# Patient Record
Sex: FEMALE | Race: WHITE | Employment: FULL TIME | URBAN - METROPOLITAN AREA
[De-identification: names, ages, dates, MRNs, and addresses within clinical notes are randomized per-mention and may not be internally consistent; named-entity substitution may affect disease eponyms.]

---

## 2021-09-30 ENCOUNTER — THERAPY VISIT (OUTPATIENT)
Dept: PHYSICAL THERAPY | Facility: CLINIC | Age: 31
End: 2021-09-30
Payer: COMMERCIAL

## 2021-09-30 DIAGNOSIS — M53.3 PAIN IN THE COCCYX: Primary | ICD-10-CM

## 2021-09-30 DIAGNOSIS — Z33.1 PREGNANCY, INCIDENTAL: ICD-10-CM

## 2021-09-30 PROCEDURE — 97161 PT EVAL LOW COMPLEX 20 MIN: CPT | Mod: GP | Performed by: PHYSICAL THERAPIST

## 2021-09-30 PROCEDURE — 97530 THERAPEUTIC ACTIVITIES: CPT | Mod: GP | Performed by: PHYSICAL THERAPIST

## 2021-09-30 PROCEDURE — 97110 THERAPEUTIC EXERCISES: CPT | Mod: GP | Performed by: PHYSICAL THERAPIST

## 2021-09-30 NOTE — PROGRESS NOTES
Physical Therapy Initial Evaluation  Subjective:    Patient Health History         Pain is reported as 4/10 on pain scale.  General health as reported by patient is excellent.  Pertinent medical history includes: depression and numbness/tingling.        Surgeries include:  Other and orthopedic surgery. Other surgery history details: L foot in 2012.    Current medications:  Other. Other medications details: prenatal, vitamin D3.    Current occupation is .   Primary job tasks include:  Computer work and prolonged sitting.                                    Objective:  System    Physical Exam    General     ROS    Assessment/Plan:

## 2021-09-30 NOTE — PROGRESS NOTES
Physical Therapy Initial Evaluation  Subjective:  The history is provided by the patient. No  was used.   Therapist Generated HPI Evaluation  Problem details: 2021 onset coccyx pain approx 10 weeks into current pregnancy ( due ).  No trauma to tailbone.  Did approx 6 visits of PT for general stretches (naveen pose) and some external releases into gluts.    Had short periods of relief, then approx 4 weeks ago symptoms started again, with intermittant left leg pain.  Reports pain better in the morning- will sit on couch with legs extended and after 45 min pain will start.  Has standing desk for work which has been helpful.  No issues with walking, increases with transfer from sitting to standing.    Better: sitting on exercise ball for work, standing, lying down.    Current activity: walking 30 min 4-5 times/week; prenatal yoga 2-3 x/week.    [facility].  [facility] for [facility] classes    Goals: get pelvic floor ready for delivery and decrease coccyx pain.  Red flags:no  Informed consent for internal vaginal and/or rectal examination for assessment of pelvic floor function:yes- external due to pregnancy    Urination:  Do you leak on the way to the bathroom or with a strong urge to void? no   Do you leak with cough,sneeze, jumping, running? no  Any other activities that cause leaking?  no  Do you have triggers that make you feel you can't wait to go to the bathroom?  What are they? Standing after sitting.  Type of pad and number used per day? 0  When you leak what is the amount? 0  How long can you delay the need to urinate? 30 min.   Do you feel excessive pressure in pelvic floor:no.  When? NA  Frequency of daytime urination:every 1.5-3 hrs  Frequency of nighttime urination:2-3x  Can you stop the flow of urine when on the toilet? yes  Is the volume of urine passed usually:  (8sec rule= 250ml with average bladder storing 400-600ml) med  Do you strain to pass  urine? no  Do you have a slow or hesitant urinary stream? no  Do you have difficulty initiating the urine stream? no  Is urination painful? no  How many bladder infections have you had in last 12 months?0  Fluid intake(one glass is 8oz or one cup) 30 oz glasses/day, 0caffinated glasses/day  0 alcohol glasses/day.  Bowel habits:  Frequency of bowel movements? 7 times a week  Consistancy of stool?  [place] Stool Scale 4  Do you ignore the urge to defecate? no  Do you strain to pass stool? no  Pelvic Pain:  Do you have any pelvic pain with intercourse, exams, use of tampons? yes  Is initial penetration during intercourse painful? yes  Is deeper penetration painful? yes  Do you use lubricant?  yes  Are you sexually active? yes  Have you ever been worried for your physical safety? no  Have you practiced the PF(kegel) exercises for 4 or more weeks?no  Marinoff Scale:Level 1  (Level 3: Abstinence from intercourse because of severe pain. Level 2: Painful intercourse which limites frequency of activity. Level 1: Painful intercourse not severe enough to prevent activity.)    .         Type of problem:  Pelvic dysfunction.    This is a chronic condition.  Condition occurred with:  During pregnancy.  Where condition occurred: other.  Patient reports pain:  Coccyx.  Pain is described as aching and is intermittent.  Pain radiates to:  Gluteal left. Pain is worse during the day.  Since onset symptoms are unchanged.  Associated symptoms:  Tingling. Symptoms are exacerbated by sitting  and relieved by activity/movement.    Previous treatment includes physical therapy. There was none improvement following previous treatment.  Restrictions due to condition include:  Working in normal job without restrictions.  Barriers include:  None as reported by patient.                        Objective:  System         Lumbar/SI Evaluation            Neural Tension/Mobility:  Neural tension wnl lumbar: concordant improved with prone unilateral  lumbar extension.    Left side:  Slump positive.       Functional Tests:  Core strength and proprioception lumbar: coocyx non painful.                                    Pelvic Dysfunction Evaluation:          Abdominal Wall:  Abdominal wall pelvic: Normal pregnancy related changes and COLETTE not appreciated.        Pelvic Clock Exam:  Pelvic clock exam: external.          Perineal Body:  +  SI Provocation:      Negative for:  SI Compression        External Assessment:        Bearing Down/Coughing:  Normal      Muscle Contraction/Perineal Mobility:  Elevation and urogential triangle descent  Internal Assessment:  Internal assessment pelvic: Cuing for pelvic floor drop/bulge.  Rectal assessment for coccyx defferred until trial of mechanical testing.                                     Stalin Lumbar Evaluation    Posture:  Sitting: poor        Correction of Posture: better  Other Observations: PPT in sitting with inc load to coccyx                                                 ROS    Assessment/Plan:    Patient is a 30 year old female with pelvic complaints.    Patient has the following significant findings with corresponding treatment plan.                Diagnosis 1:  Coccyx pain  Pain -  manual therapy, splint/taping/bracing/orthotics, self management, education, directional preference exercise and home program  Impaired muscle performance - neuro re-education  Decreased function - therapeutic activities  Impaired posture - neuro re-education  Diagnosis 2:  Pregnancy   Impaired muscle performance - neuro re-education, home program and TE, TA  Decreased function - therapeutic activities and home program    Therapy Evaluation Codes:   1) History comprised of:   Personal factors that impact the plan of care:      None.    Comorbidity factors that impact the plan of care are:      Current pregnancy.     Medications impacting care: None.  2) Examination of Body Systems comprised of:   Body structures and functions that  impact the plan of care:      Pelvis.   Activity limitations that impact the plan of care are:      sitting  3) Clinical presentation characteristics are:   Stable/Uncomplicated.  4) Decision-Making    Low complexity using standardized patient assessment instrument and/or measureable assessment of functional outcome.  Cumulative Therapy Evaluation is: Low complexity.    Previous and current functional limitations:  (See Goal Flow Sheet for this information)    Short term and Long term goals: (See Goal Flow Sheet for this information)     Communication ability:  Patient appears to be able to clearly communicate and understand verbal and written communication and follow directions correctly.  Treatment Explanation - The following has been discussed with the patient:   RX ordered/plan of care  Anticipated outcomes  Possible risks and side effects  This patient would benefit from PT intervention to resume normal activities.   Rehab potential is good.    Frequency:  1 X week, once daily every 3 weeks  Duration:  for 12 weeks  Discharge Plan:  Achieve all LTG.  Independent in home treatment program.  Reach maximal therapeutic benefit.    Please refer to the daily flowsheet for treatment today, total treatment time and time spent performing 1:1 timed codes.

## 2021-09-30 NOTE — LETTER
[facility]  [address] [address]  [phone]    2021    Re: Elva Cota   :   1990  MRN:  [id number]   REFERRING PHYSICIAN:   Calli PARKS [facility]    Date of Initial Evaluation:  21  Visits:  Rxs Used: 1  Reason for Referral:     Pain in the coccyx  Pregnancy, incidental    EVALUATION SUMMARY    Physical Therapy Initial Evaluation  Subjective:    Patient Health History     Pain is reported as 4/10 on pain scale.  General health as reported by patient is excellent.  Pertinent medical history includes: depression and numbness/tingling.        Surgeries include:  Other and orthopedic surgery. Other surgery history details: L foot in .    Current medications:  Other. Other medications details: prenatal, vitamin D3.    Current occupation is .   Primary job tasks include:  Computer work and prolonged sitting.     The history is provided by the patient. No  was used.   Therapist Generated HPI Evaluation  Problem details: 2021 onset coccyx pain approx 10 weeks into current pregnancy ( due ).  No trauma to tailbone.  Did approx 6 visits of PT for general stretches (naveen pose) and some external releases into gluts.    Had short periods of relief, then approx 4 weeks ago symptoms started again, with intermittant left leg pain.  Reports pain better in the morning- will sit on couch with legs extended and after 45 min pain will start.  Has standing desk for work which has been helpful.  No issues with walking, increases with transfer from sitting to standing.    Better: sitting on exercise ball for work, standing, lying down.    Current activity: walking 30 min 4-5 times/week; prenatal yoga 2-3 x/week.    [facility].  [facility] for prenatal classes    Goals: get pelvic floor ready  for delivery and decrease coccyx pain.  Re: Elva Cota   :   1990    Red flags:no  Informed consent for internal vaginal and/or rectal examination for assessment of pelvic floor function:yes- external due to pregnancy    Urination:  Do you leak on the way to the bathroom or with a strong urge to void? no   Do you leak with cough,sneeze, jumping, running? no  Any other activities that cause leaking?  no  Do you have triggers that make you feel you can't wait to go to the bathroom?  What are they? Standing after sitting.  Type of pad and number used per day? 0  When you leak what is the amount? 0  How long can you delay the need to urinate? 30 min.   Do you feel excessive pressure in pelvic floor:no.  When? NA  Frequency of daytime urination:every 1.5-3 hrs  Frequency of nighttime urination:2-3x  Can you stop the flow of urine when on the toilet? yes  Is the volume of urine passed usually:  (8sec rule= 250ml with average bladder storing 400-600ml) med  Do you strain to pass urine? no  Do you have a slow or hesitant urinary stream? no  Do you have difficulty initiating the urine stream? no  Is urination painful? no  How many bladder infections have you had in last 12 months?0  Fluid intake(one glass is 8oz or one cup) 30 oz glasses/day, 0caffinated glasses/day  0 alcohol glasses/day.  Bowel habits:  Frequency of bowel movements? 7 times a week  Consistancy of stool?  [place] Stool Scale 4  Do you ignore the urge to defecate? no  Do you strain to pass stool? no  Pelvic Pain:  Do you have any pelvic pain with intercourse, exams, use of tampons? yes  Is initial penetration during intercourse painful? yes  Is deeper penetration painful? yes  Do you use lubricant?  yes  Are you sexually active? yes  Have you ever been worried for your physical safety? no  Have you practiced the PF(kegel) exercises for 4 or more weeks?no  Marinoff Scale:Level 1  (Level 3: Abstinence from intercourse because of severe pain.  Level 2: Painful intercourse which limites frequency of activity. Level 1: Painful intercourse not severe enough to prevent activity.)    Type of problem:  Pelvic dysfunction.    This is a chronic condition.  Condition occurred with:  During pregnancy.  Where condition occurred: other.  Patient reports pain:  Coccyx.  Pain is described as aching and is intermittent.  Re: Elva PARKS Beata   :   1990    Pain radiates to:  Gluteal left. Pain is worse during the day.  Since onset symptoms are unchanged.  Associated symptoms:  Tingling. Symptoms are exacerbated by sitting  and relieved by activity/movement.    Previous treatment includes physical therapy. There was none improvement following previous treatment.  Restrictions due to condition include:  Working in normal job without restrictions.  Barriers include:  None as reported by patient.    Objective:  System       Lumbar/SI Evaluation    Neural Tension/Mobility:  Neural tension wnl lumbar: concordant improved with prone unilateral lumbar extension.    Left side:  Slump positive.     Functional Tests:  Core strength and proprioception lumbar: coocyx non painful.    Pelvic Dysfunction Evaluation:      Abdominal Wall:  Abdominal wall pelvic: Normal pregnancy related changes and COLETTE not appreciated.    Pelvic Clock Exam:  Pelvic clock exam: external.    Perineal Body:  +  SI Provocation:      Negative for:  SI Compression    External Assessment:      Bearing Down/Coughing:  Normal    Muscle Contraction/Perineal Mobility:  Elevation and urogential triangle descent  Internal Assessment:  Internal assessment pelvic: Cuing for pelvic floor drop/bulge.  Rectal assessment for coccyx defferred until trial of mechanical testing.    Stalin Lumbar Evaluation    Posture:  Sitting: poor    Correction of Posture: better  Other Observations: PPT in sitting with inc load to coccyx    ROS    Re: Elva PARKS Beata   :   1990    Assessment/Plan:    Patient is a 30 year  old female with pelvic complaints.    Patient has the following significant findings with corresponding treatment plan.                Diagnosis 1:  Coccyx pain  Pain -  manual therapy, splint/taping/bracing/orthotics, self management, education, directional preference exercise and home program  Impaired muscle performance - neuro re-education  Decreased function - therapeutic activities  Impaired posture - neuro re-education  Diagnosis 2:  Pregnancy   Impaired muscle performance - neuro re-education, home program and TE, TA  Decreased function - therapeutic activities and home program    Therapy Evaluation Codes:   1) History comprised of:   Personal factors that impact the plan of care:      None.    Comorbidity factors that impact the plan of care are:      Current pregnancy.     Medications impacting care: None.  2) Examination of Body Systems comprised of:   Body structures and functions that impact the plan of care:      Pelvis.   Activity limitations that impact the plan of care are:      sitting  3) Clinical presentation characteristics are:   Stable/Uncomplicated.  4) Decision-Making    Low complexity using standardized patient assessment instrument and/or measureable assessment of functional outcome.  Cumulative Therapy Evaluation is: Low complexity.    Previous and current functional limitations:  (See Goal Flow Sheet for this information)    Short term and Long term goals: (See Goal Flow Sheet for this information)     Communication ability:  Patient appears to be able to clearly communicate and understand verbal and written communication and follow directions correctly.  Treatment Explanation - The following has been discussed with the patient:   RX ordered/plan of care  Anticipated outcomes  Possible risks and side effects  This patient would benefit from PT intervention to resume normal activities.   Rehab potential is good.    Frequency:  1 X week, once daily every 3 weeks  Duration:  for 12  weeks  Discharge Plan:  Achieve all LTG.  Independent in home treatment program.  Reach maximal therapeutic benefit.    Thank you for your referral.    Re: Evla Cota   :   1990    INQUIRIES  Therapist: Guerline Rosenberg PT  [address] [address]  Phone: [phone]  Fax: [phone]

## 2021-10-14 ENCOUNTER — THERAPY VISIT (OUTPATIENT)
Dept: PHYSICAL THERAPY | Facility: CLINIC | Age: 31
End: 2021-10-14
Payer: COMMERCIAL

## 2021-10-14 DIAGNOSIS — Z33.1 PREGNANCY, INCIDENTAL: ICD-10-CM

## 2021-10-14 DIAGNOSIS — M53.3 PAIN IN THE COCCYX: Primary | ICD-10-CM

## 2021-10-14 PROCEDURE — 97110 THERAPEUTIC EXERCISES: CPT | Mod: GP | Performed by: PHYSICAL THERAPIST

## 2021-10-14 PROCEDURE — 97140 MANUAL THERAPY 1/> REGIONS: CPT | Mod: GP | Performed by: PHYSICAL THERAPIST

## 2021-11-19 ENCOUNTER — THERAPY VISIT (OUTPATIENT)
Dept: PHYSICAL THERAPY | Facility: CLINIC | Age: 31
End: 2021-11-19
Payer: COMMERCIAL

## 2021-11-19 DIAGNOSIS — M53.3 PAIN IN THE COCCYX: Primary | ICD-10-CM

## 2021-11-19 PROCEDURE — 97112 NEUROMUSCULAR REEDUCATION: CPT | Mod: GP | Performed by: PHYSICAL THERAPIST

## 2021-11-19 PROCEDURE — 97530 THERAPEUTIC ACTIVITIES: CPT | Mod: GP | Performed by: PHYSICAL THERAPIST

## 2021-11-19 NOTE — PROGRESS NOTES
Discharge Note    Progress reporting period is from initial eval to Nov 19, 2021.     Elva failed to return for next follow up visit and current status is unknown..  Patient seen for 3 visits.  SUBJECTIVE  Subjective changes noted by patient:  Doing well- almost 34 weeks.  CTS both hands going back to acupuncture.  Been seeing Dr. Amin every week for adjustments and pleased with mobility.  Some right groin tenderness; tailbone was doing well then fell off exercises due to move then did a childbirth class in poor chair and had a flare.  Has decreased after doing exercise.  Wants to understand positioning during birth that would be relaxing and allow for easiest pushing.  .  Current pain level is 0/10.     Previous pain level was   .   Changes in function:  Yes (See Goal flowsheet attached for changes in current functional level)  Adverse reaction to treatment or activity: None    OBJECTIVE  Changes noted in objective findings: sEMG in multiple labor positions showed best resting tone and bulge in supported squat and sidelying     ASSESSMENT/PLAN  Diagnosis: Coccyx pain   DIAGP:  The encounter diagnosis was Pain in the coccyx.  Updated problem list and treatment plan:   Impaired muscle performance - HEP  STG/LTGs have been met or progress has been made towards goals:  Yes, please see goal flowsheet for most current information  Assessment of Progress: current status is indep w hep.  Last current status:     Self Management Plans:  HEP  I have re-evaluated this patient and find that the nature, scope, duration and intensity of the therapy is appropriate for the medical condition of the patient.  Elva continues to require the following intervention to meet STG and LTG's:  HEP.    Recommendations:  Discharge with current home program.  Patient to follow up with MD as needed.    Please refer to the daily flowsheet for treatment today, total treatment time and time spent performing 1:1 timed codes.

## 2022-02-24 ENCOUNTER — THERAPY VISIT (OUTPATIENT)
Dept: PHYSICAL THERAPY | Facility: CLINIC | Age: 32
End: 2022-02-24
Payer: COMMERCIAL

## 2022-02-24 DIAGNOSIS — M53.3 PAIN IN THE COCCYX: Primary | ICD-10-CM

## 2022-02-24 DIAGNOSIS — R32 URINARY INCONTINENCE: ICD-10-CM

## 2022-02-24 PROCEDURE — 97110 THERAPEUTIC EXERCISES: CPT | Mod: GP | Performed by: PHYSICAL THERAPIST

## 2022-02-24 PROCEDURE — 97161 PT EVAL LOW COMPLEX 20 MIN: CPT | Mod: GP | Performed by: PHYSICAL THERAPIST

## 2022-02-24 PROCEDURE — 97112 NEUROMUSCULAR REEDUCATION: CPT | Mod: GP | Performed by: PHYSICAL THERAPIST

## 2022-02-25 NOTE — PROGRESS NOTES
Physical Therapy Initial Evaluation  Subjective:  The history is provided by the patient. No  was used.   Therapist Generated HPI Evaluation  Problem details: Onset date/MD order: 2022.    CC/Present symptoms: UI, pelvic pain, coccydynia.   HPI/SMHx/Childbirth hx:: vaginal delivery with gr 2 tearing 6 weeks ago  Pain rating (0-10): 3  Conditioning is improving/unchanging/worsening: improving    Hx of or present sexually transmitted disease:no  Current occupation: RTW 2022  Current activity: walking  Goals: pilates, peloton, no leakage  Red flags:no  Informed consent for internal vaginal and/or rectal examination for assessment of pelvic floor function:yes    Urination:  Do you leak on the way to the bathroom or with a strong urge to void? no   Do you leak with cough,sneeze, jumping, running? yes  Any other activities that cause leaking?  orgasm  Do you have triggers that make you feel you can't wait to go to the bathroom?  What are they? no.  Type of pad and number used per day? 0  When you leak what is the amount? small  How long can you delay the need to urinate? hours.   Do you feel excessive pressure in pelvic floor:no.  When? NA  Frequency of daytime urination:not sure  Frequency of nighttime urination:2x  Can you stop the flow of urine when on the toilet? yes  Is the volume of urine passed usually:  (8sec rule= 250ml with average bladder storing 400-600ml) avg  Do you strain to pass urine? no  Do you have a slow or hesitant urinary stream? no  Do you have difficulty initiating the urine stream? no  Is urination painful? no  How many bladder infections have you had in last 12 months?0  Fluid intake(one glass is 8oz or one cup) 20 glasses/day, 0 caffinated glasses/day  0 alcohol glasses/day.  Bowel habits:  Frequency of bowel movements? 2 times a day  Consistancy of stool?  [place] Stool Scale 4  Do you ignore the urge to defecate? no  Do you strain to pass stool? no  Pelvic  Pain:  Do you have any pelvic pain with intercourse, exams, use of tampons? Pelvic pain with sitting (scar tissue feels hard)  Is initial penetration during intercourse painful? Not had intercourse since birth  Is deeper penetration painful? See above  Do you use lubricant?  no  Are you sexually active? Not currently  Have you ever been worried for your physical safety? no  Have you practiced the PF(kegel) exercises for 4 or more weeks?no  Marinoff Scale:Level NA  (Level 3: Abstinence from intercourse because of severe pain. Level 2: Painful intercourse which limites frequency of activity. Level 1: Painful intercourse not severe enough to prevent activity.)    .         Type of problem:  Pelvic dysfunction and incontinence.    This is a chronic condition.  Condition occurred with:  After pregnancy and during pregnancy.  Where condition occurred: other.  Patient reports pain:  Lower lumbar spine, SI joint right, coccyx and vaginal.  Pain is described as aching and is intermittent.  Pain is worse during the day.  Since onset symptoms are gradually improving.  Symptoms are exacerbated by intercourse, sitting, jumping, coughing and sneezing  and relieved by nothing.    Previous treatment includes physical therapy. There was moderate improvement following previous treatment.  Work activity restrictions: RTW 4/1/2022.  Barriers include:  None as reported by patient.                        Objective:  System         Lumbar/SI Evaluation  ROM:    AROM Lumbar:   Flexion:       + reproduction for coccyx pain dec/better  RFIL  Ext:                      Side Bend:        Left:     Right:   Rotation:           Left:     Right:   Side Glide:        Left:     Right:                                                     Pelvic Dysfunction Evaluation:        Flexibility:  normal      Abdominal Wall:  normal        Pelvic Clock Exam:    Ischiocavernosis pain:  ++  Bulbocavernosis pain:  ++  Transverse Perineal:  ++  Levator ANI:   ++  Perineal Body:  ++      External Assessment:    Skin Condition:  Irritation  Scars:  Tender  Bearing Down/Coughing:  Normal  Tissue Symmetry:  Normal    Muscle Contraction/Perineal Mobility:  Slight lift, no urogential triangle descent  Internal Assessment:    Sensory Exam:  Normal  Contraction/Grade:  Weak squeeze, 2 second hold (2)                               General     ROS    Assessment/Plan:    Patient is a 31 year old female with pelvic complaints.    Patient has the following significant findings with corresponding treatment plan.                Diagnosis 1:  UI  Decreased strength - therapeutic exercise and therapeutic activities  Impaired muscle performance - biofeedback and neuro re-education  Decreased function - therapeutic activities  Diagnosis 2:  Coccydynia   Pain -  manual therapy, self management, education, directional preference exercise and home program  Impaired muscle performance - neuro re-education  Decreased function - therapeutic activities    Therapy Evaluation Codes:   1) History comprised of:   Personal factors that impact the plan of care:      None.    Comorbidity factors that impact the plan of care are:      None.     Medications impacting care: None.  2) Examination of Body Systems comprised of:   Body structures and functions that impact the plan of care:      Lumbar spine and Pelvis.   Activity limitations that impact the plan of care are:      Sitting and Urinary incontinence.  3) Clinical presentation characteristics are:   Stable/Uncomplicated.  4) Decision-Making    Low complexity using standardized patient assessment instrument and/or measureable assessment of functional outcome.  Cumulative Therapy Evaluation is: Low complexity.    Previous and current functional limitations:  (See Goal Flow Sheet for this information)    Short term and Long term goals: (See Goal Flow Sheet for this information)     Communication ability:  Patient appears to be able to clearly  communicate and understand verbal and written communication and follow directions correctly.  Treatment Explanation - The following has been discussed with the patient:   RX ordered/plan of care  Anticipated outcomes  Possible risks and side effects  This patient would benefit from PT intervention to resume normal activities.   Rehab potential is good.    Frequency:  1 X week, once daily  Duration:  for 12 weeks  Discharge Plan:  Achieve all LTG.  Independent in home treatment program.  Reach maximal therapeutic benefit.    Please refer to the daily flowsheet for treatment today, total treatment time and time spent performing 1:1 timed codes.

## 2022-03-10 ENCOUNTER — THERAPY VISIT (OUTPATIENT)
Dept: PHYSICAL THERAPY | Facility: CLINIC | Age: 32
End: 2022-03-10
Payer: COMMERCIAL

## 2022-03-10 DIAGNOSIS — M53.3 PAIN IN THE COCCYX: Primary | ICD-10-CM

## 2022-03-10 DIAGNOSIS — Z33.1 PREGNANCY, INCIDENTAL: ICD-10-CM

## 2022-03-10 DIAGNOSIS — R32 URINARY INCONTINENCE: ICD-10-CM

## 2022-03-10 PROCEDURE — 97530 THERAPEUTIC ACTIVITIES: CPT | Mod: GP | Performed by: PHYSICAL THERAPIST

## 2022-03-10 PROCEDURE — 97110 THERAPEUTIC EXERCISES: CPT | Mod: GP | Performed by: PHYSICAL THERAPIST

## 2022-03-10 PROCEDURE — 97140 MANUAL THERAPY 1/> REGIONS: CPT | Mod: GP | Performed by: PHYSICAL THERAPIST

## 2022-06-05 ENCOUNTER — HEALTH MAINTENANCE LETTER (OUTPATIENT)
Age: 32
End: 2022-06-05

## 2022-10-15 ENCOUNTER — HEALTH MAINTENANCE LETTER (OUTPATIENT)
Age: 32
End: 2022-10-15

## 2022-10-28 ENCOUNTER — THERAPY VISIT (OUTPATIENT)
Dept: PHYSICAL THERAPY | Facility: CLINIC | Age: 32
End: 2022-10-28
Payer: COMMERCIAL

## 2022-10-28 DIAGNOSIS — R32 URINARY INCONTINENCE: ICD-10-CM

## 2022-10-28 DIAGNOSIS — M53.3 PAIN IN THE COCCYX: Primary | ICD-10-CM

## 2022-10-28 PROCEDURE — 97110 THERAPEUTIC EXERCISES: CPT | Mod: GP | Performed by: PHYSICAL THERAPIST

## 2022-10-28 PROCEDURE — 97530 THERAPEUTIC ACTIVITIES: CPT | Mod: GP | Performed by: PHYSICAL THERAPIST

## 2022-10-28 NOTE — PROGRESS NOTES
"PROGRESS  REPORT    Progress reporting period is from 3/2022 to 10/28/2022.       SUBJECTIVE  Subjective changes noted by patient:  yes.  Subjective: No feelings of prolapse; no leakage with laughing/running.  Leakage occuring with orgasm, sometimes passing gas.  Tailbone pain still present.  Doing exercises and not much change.    Current pain level is 0/10  .     Previous pain level was  2/10  .   Changes in function:  Yes (See Goal flowsheet attached for changes in current functional level)  Adverse reaction to treatment or activity: None    OBJECTIVE  Changes noted in objective findings:  Yes  Objective: Leakage amount: small to medium.  Internal PFM assessment vaginal: improved squeeze pressure with \"stop urine flow\"; dec posterior wall compared to anterior wall; slight lift with endurance <5 sec. Rectal: no pain reproduction with direct palpation of tailbone.  Suspect PPT in sitting exacerbates tailbone pain.     ASSESSMENT/PLAN  Updated problem list and treatment plan: Diagnosis 1:  Coccyx pain  Pain -  education, directional preference exercise and home program  Impaired muscle performance - neuro re-education  Decreased function - therapeutic activities  Impaired posture - neuro re-education  Diagnosis 2:  UI   Decreased proprioception - neuro re-education and therapeutic activities  Impaired muscle performance - biofeedback and neuro re-education  Decreased function - therapeutic activities  STG/LTGs have been met or progress has been made towards goals:  Yes (See Goal flow sheet completed today.)  Assessment of Progress: The patient's condition is improving.  Self Management Plans:  Patient has been instructed in a home treatment program.  I have re-evaluated this patient and find that the nature, scope, duration and intensity of the therapy is appropriate for the medical condition of the patient.  Elva continues to require the following intervention to meet STG and LTG's:  PT    Recommendations:  This " patient would benefit from continued therapy.     Frequency:  1 X week, once daily every 4 weeks  Duration:  for 8 weeks        Please refer to the daily flowsheet for treatment today, total treatment time and time spent performing 1:1 timed codes.

## 2023-06-11 ENCOUNTER — HEALTH MAINTENANCE LETTER (OUTPATIENT)
Age: 33
End: 2023-06-11

## 2024-08-04 ENCOUNTER — HEALTH MAINTENANCE LETTER (OUTPATIENT)
Age: 34
End: 2024-08-04

## 2024-11-20 ENCOUNTER — APPOINTMENT (OUTPATIENT)
Dept: URBAN - METROPOLITAN AREA CLINIC 255 | Age: 34
Setting detail: DERMATOLOGY
End: 2024-11-20

## 2024-11-20 VITALS — HEIGHT: 64 IN | WEIGHT: 180 LBS

## 2024-11-20 DIAGNOSIS — L73.8 OTHER SPECIFIED FOLLICULAR DISORDERS: ICD-10-CM

## 2024-11-20 DIAGNOSIS — L71.0 PERIORAL DERMATITIS: ICD-10-CM

## 2024-11-20 DIAGNOSIS — L90.8 OTHER ATROPHIC DISORDERS OF SKIN: ICD-10-CM

## 2024-11-20 PROCEDURE — OTHER MIPS QUALITY: OTHER

## 2024-11-20 PROCEDURE — OTHER PRESCRIPTION MEDICATION MANAGEMENT: OTHER

## 2024-11-20 PROCEDURE — OTHER ADDITIONAL NOTES: OTHER

## 2024-11-20 PROCEDURE — 99203 OFFICE O/P NEW LOW 30 MIN: CPT

## 2024-11-20 PROCEDURE — OTHER OTC TREATMENT REGIMEN: OTHER

## 2024-11-20 PROCEDURE — OTHER COUNSELING: OTHER

## 2024-11-20 PROCEDURE — OTHER PATIENT SPECIFIC COUNSELING: OTHER

## 2024-11-20 PROCEDURE — OTHER PRESCRIPTION: OTHER

## 2024-11-20 RX ORDER — METRONIDAZOLE 7.5 MG/G
CREAM TOPICAL BID
Qty: 45 | Refills: 1 | Status: ERX | COMMUNITY
Start: 2024-11-20

## 2024-11-20 ASSESSMENT — LOCATION DETAILED DESCRIPTION DERM
LOCATION DETAILED: GLABELLA
LOCATION DETAILED: LEFT LOWER CUTANEOUS LIP
LOCATION DETAILED: RIGHT ORAL COMMISSURE
LOCATION DETAILED: LEFT INFERIOR MEDIAL MALAR CHEEK

## 2024-11-20 ASSESSMENT — LOCATION ZONE DERM
LOCATION ZONE: LIP
LOCATION ZONE: FACE

## 2024-11-20 ASSESSMENT — LOCATION SIMPLE DESCRIPTION DERM
LOCATION SIMPLE: GLABELLA
LOCATION SIMPLE: LEFT CHEEK
LOCATION SIMPLE: RIGHT LIP
LOCATION SIMPLE: LEFT LIP

## 2024-11-20 NOTE — PROCEDURE: PATIENT SPECIFIC COUNSELING
Detail Level: Zone
-Informed patient that symptoms appear consistent with Perioral dermatitis.\\n-Recommended treatment course of topical abx. Reviewed in detail. \\n-Patient was agreeable.\\n-Will consider oral abx if condition does not improve in 3 weeks.
-Discussed cosmetic treatment of cautery. Reviewed in detail.\\n-Patient deferred treatment today and opted to RTC after the holidays.

## 2024-11-20 NOTE — HPI: RASH
What Type Of Note Output Would You Prefer (Optional)?: Standard Output
Is This A New Presentation, Or A Follow-Up?: Rash
Additional History: It started out as cracking at the end of her lips. She has been using hydrocortisone and Neosporin which helped a little, but a couple of days ago it spread to her whole lips. Her lips are flaking and peeling. She reports that this happened in high school and was told that it was hormonal related. She was prescribed a topical and notes improvement. She reports that she had a baby six months ago.

## 2024-11-20 NOTE — PROCEDURE: PRESCRIPTION MEDICATION MANAGEMENT
Initiate Treatment: Apply a thin layer of metronidazole 0.75 % topical cream BID to rash face twice daily for 1 month. Repeat as needed for flares.
Render In Strict Bullet Format?: No
Detail Level: Zone
Plan: RTC if condition does not improve and will consider oral abx.

## 2024-11-20 NOTE — PROCEDURE: ADDITIONAL NOTES
Additional Notes: -Patient notes she is interested in tretinoin. Will plan to start tretinoin after resolving Perioral dermatitis.\\n-Discussed that she would need to stop tretinoin a week prior to any laser or cosmetic treatments.
Detail Level: Simple
Render Risk Assessment In Note?: no

## 2024-12-20 ENCOUNTER — APPOINTMENT (OUTPATIENT)
Dept: URBAN - METROPOLITAN AREA CLINIC 255 | Age: 34
Setting detail: DERMATOLOGY
End: 2024-12-20

## 2024-12-20 VITALS — HEIGHT: 64 IN | WEIGHT: 180 LBS

## 2024-12-20 DIAGNOSIS — L71.0 PERIORAL DERMATITIS: ICD-10-CM

## 2024-12-20 DIAGNOSIS — K13.0 DISEASES OF LIPS: ICD-10-CM

## 2024-12-20 DIAGNOSIS — D485 NEOPLASM OF UNCERTAIN BEHAVIOR OF SKIN: ICD-10-CM

## 2024-12-20 PROBLEM — D48.5 NEOPLASM OF UNCERTAIN BEHAVIOR OF SKIN: Status: ACTIVE | Noted: 2024-12-20

## 2024-12-20 PROCEDURE — 11301 SHAVE SKIN LESION 0.6-1.0 CM: CPT

## 2024-12-20 PROCEDURE — OTHER PRESCRIPTION: OTHER

## 2024-12-20 PROCEDURE — 11300 SHAVE SKIN LESION 0.5 CM/<: CPT

## 2024-12-20 PROCEDURE — OTHER MIPS QUALITY: OTHER

## 2024-12-20 PROCEDURE — OTHER COUNSELING: OTHER

## 2024-12-20 PROCEDURE — OTHER SHAVE REMOVAL: OTHER

## 2024-12-20 PROCEDURE — OTHER PRESCRIPTION MEDICATION MANAGEMENT: OTHER

## 2024-12-20 PROCEDURE — 99213 OFFICE O/P EST LOW 20 MIN: CPT | Mod: 25

## 2024-12-20 RX ORDER — DOXYCYCLINE 100 MG/1
CAPSULE ORAL BID
Qty: 60 | Refills: 0 | Status: ERX | COMMUNITY
Start: 2024-12-20

## 2024-12-20 RX ORDER — NYSTATIN OINTMENT 100000 [USP'U]/G
OINTMENT TOPICAL
Qty: 15 | Refills: 0 | Status: ERX | COMMUNITY
Start: 2024-12-20

## 2024-12-20 ASSESSMENT — LOCATION SIMPLE DESCRIPTION DERM
LOCATION SIMPLE: RIGHT UPPER LIP
LOCATION SIMPLE: RIGHT LIP
LOCATION SIMPLE: LEFT LIP
LOCATION SIMPLE: RIGHT BREAST
LOCATION SIMPLE: RIGHT AXILLARY VAULT

## 2024-12-20 ASSESSMENT — LOCATION DETAILED DESCRIPTION DERM
LOCATION DETAILED: RIGHT SUPERIOR VERMILION BORDER
LOCATION DETAILED: RIGHT ORAL COMMISSURE
LOCATION DETAILED: RIGHT INFRAMAMMARY CREASE (INNER QUADRANT)
LOCATION DETAILED: LEFT LOWER CUTANEOUS LIP
LOCATION DETAILED: RIGHT AXILLARY VAULT

## 2024-12-20 ASSESSMENT — LOCATION ZONE DERM
LOCATION ZONE: LIP
LOCATION ZONE: TRUNK
LOCATION ZONE: AXILLAE

## 2024-12-20 NOTE — PROCEDURE: SHAVE REMOVAL
Medical Necessity Information: It is in your best interest to select a reason for this procedure from the list below. All of these items fulfill various CMS LCD requirements except the new and changing color options.
Medical Necessity Clause: This procedure was medically necessary because the lesion that was treated was:
Lab: -[id number]
Lab Facility: [id number]
Detail Level: Detailed
Was A Bandage Applied: Yes
Size Of Lesion In Cm (Required): 0.7
Depth Of Shave: dermis
Biopsy Method: Dermablade
Anesthesia Type: 1% lidocaine with epinephrine and a 1:10 solution of 8.4% sodium bicarbonate
Hemostasis: Drysol
Wound Care: Petrolatum
Path Notes Override (Will Replace All Of The Above Text): Please check margins.
Render Path Notes In Note?: No
Consent was obtained from the patient. The risks and benefits to therapy were discussed in detail. Specifically, the risks of infection, scarring, bleeding, prolonged wound healing, incomplete removal, allergy to anesthesia, nerve injury and recurrence were addressed. Prior to the procedure, the treatment site was clearly identified and confirmed by the patient. All components of Universal Protocol/PAUSE Rule completed.
Post-Care Instructions: I reviewed with the patient in detail post-care instructions. Patient is to keep the biopsy site dry overnight, and then apply bacitracin twice daily until healed. Patient may apply hydrogen peroxide soaks to remove any crusting.
Notification Instructions: Patient will be notified of pathology results. However, patient instructed to call the office if not contacted within 2 weeks.
Billing Type: Third-Party Bill
Size Of Lesion In Cm (Required): 0.4

## 2024-12-20 NOTE — PROCEDURE: PRESCRIPTION MEDICATION MANAGEMENT
Initiate Treatment: Take one doxycycline monohydrate 100 mg capsule twice daily with food and water for Perioral dermatitis. Once clear decrease to QD x 1 week, then D/c. Do not lay down for 1 hour after taking.
Render In Strict Bullet Format?: No
Detail Level: Zone
Plan: RTC if condition does not improve.
Discontinue Regimen: Apply a thin layer of metronidazole 0.75 % topical cream BID to rash face twice daily for 1 month. Repeat as needed for flares.
Initiate Treatment: Apply nystatin 100,000 unit/gram topical ointment to corners of mouth twice a day until resolved.
Plan: If no improvement, consider f/u with PCP for labs.

## 2025-01-06 ENCOUNTER — APPOINTMENT (OUTPATIENT)
Dept: URBAN - METROPOLITAN AREA CLINIC 255 | Age: 35
Setting detail: DERMATOLOGY
End: 2025-01-07

## 2025-01-06 VITALS — WEIGHT: 180 LBS | HEIGHT: 64 IN

## 2025-01-06 DIAGNOSIS — K13.0 DISEASES OF LIPS: ICD-10-CM

## 2025-01-06 DIAGNOSIS — L71.0 PERIORAL DERMATITIS: ICD-10-CM

## 2025-01-06 DIAGNOSIS — L24 IRRITANT CONTACT DERMATITIS: ICD-10-CM

## 2025-01-06 DIAGNOSIS — Z71.89 OTHER SPECIFIED COUNSELING: ICD-10-CM

## 2025-01-06 DIAGNOSIS — D22 MELANOCYTIC NEVI: ICD-10-CM

## 2025-01-06 PROBLEM — L24.9 IRRITANT CONTACT DERMATITIS, UNSPECIFIED CAUSE: Status: ACTIVE | Noted: 2025-01-06

## 2025-01-06 PROBLEM — D22.5 MELANOCYTIC NEVI OF TRUNK: Status: ACTIVE | Noted: 2025-01-06

## 2025-01-06 PROCEDURE — OTHER OTC TREATMENT REGIMEN: OTHER

## 2025-01-06 PROCEDURE — OTHER PRESCRIPTION MEDICATION MANAGEMENT: OTHER

## 2025-01-06 PROCEDURE — 99213 OFFICE O/P EST LOW 20 MIN: CPT

## 2025-01-06 PROCEDURE — OTHER PHOTO-DOCUMENTATION: OTHER

## 2025-01-06 PROCEDURE — OTHER COUNSELING: OTHER

## 2025-01-06 PROCEDURE — OTHER MIPS QUALITY: OTHER

## 2025-01-06 ASSESSMENT — LOCATION DETAILED DESCRIPTION DERM
LOCATION DETAILED: LEFT LATERAL INFERIOR PRESEPTAL REGION
LOCATION DETAILED: RIGHT INFRAMAMMARY CREASE (INNER QUADRANT)
LOCATION DETAILED: LEFT MEDIAL SUPERIOR EYELID
LOCATION DETAILED: LEFT LOWER CUTANEOUS LIP
LOCATION DETAILED: RIGHT LATERAL CANTHUS
LOCATION DETAILED: LEFT LATERAL CANTHUS
LOCATION DETAILED: RIGHT LATERAL INFERIOR EYELID
LOCATION DETAILED: RIGHT LATERAL SUPERIOR EYELID
LOCATION DETAILED: RIGHT ORAL COMMISSURE
LOCATION DETAILED: RIGHT SUPERIOR VERMILION BORDER

## 2025-01-06 ASSESSMENT — LOCATION SIMPLE DESCRIPTION DERM
LOCATION SIMPLE: RIGHT EYELID
LOCATION SIMPLE: RIGHT INFERIOR EYELID
LOCATION SIMPLE: LEFT SUPERIOR EYELID
LOCATION SIMPLE: RIGHT UPPER LIP
LOCATION SIMPLE: RIGHT LIP
LOCATION SIMPLE: RIGHT BREAST
LOCATION SIMPLE: LEFT INFERIOR EYELID
LOCATION SIMPLE: LEFT LIP
LOCATION SIMPLE: RIGHT SUPERIOR EYELID
LOCATION SIMPLE: LEFT EYELID

## 2025-01-06 ASSESSMENT — BSA RASH: BSA RASH: 1

## 2025-01-06 ASSESSMENT — SEVERITY ASSESSMENT 2020: SEVERITY 2020: MILD

## 2025-01-06 ASSESSMENT — LOCATION ZONE DERM
LOCATION ZONE: EYELID
LOCATION ZONE: TRUNK
LOCATION ZONE: LIP

## 2025-01-06 NOTE — PROCEDURE: COUNSELING
Detail Level: Detailed
Detail Level: Simple
Patient Specific Counseling (Will Not Stick From Patient To Patient): -Recommended patient RTC in 2-4 weeks if bx site is not healing.\\n-Discussed ICD suspected d/t bandage. Advised alternative smaller bandage and d/c neosporin. Pt may use Tacrolimus that she has at home on surrounding area, making sure not to apply to bx site.
Patient Specific Counseling (Will Not Stick From Patient To Patient): -Recommended at least +30 SPF.\\n-Discussed there are Rx products and medical grade products that have active ingredients. Reviewed Vitamin C and retinols are more impactful as Rx and medical grade > OTC at [facility].

## 2025-01-06 NOTE — PROCEDURE: OTC TREATMENT REGIMEN
Patient Specific Otc Recommendations (Will Not Stick From Patient To Patient): Apply OTC hydrocortisone to eyelids BID until clear.
Detail Level: Zone
Patient Specific Otc Recommendations (Will Not Stick From Patient To Patient): Apply Vaseline to healing biopsy site. Avoid Neosporin.
Samples Given: Vaseline

## 2025-01-06 NOTE — PROCEDURE: PRESCRIPTION MEDICATION MANAGEMENT
Render In Strict Bullet Format?: No
Detail Level: Zone
Plan: RTC if condition does not improve.
Discontinue Regimen: Doxycycline course
Continue Regimen: Apply nystatin 100,000 unit/gram topical ointment to corners of mouth twice a day until resolved.
Plan: If no improvement, consider f/u with PCP for labs.

## 2025-01-06 NOTE — HPI: RASH
What Type Of Note Output Would You Prefer (Optional)?: Standard Output
How Severe Is Your Rash?: moderate
Is This A New Presentation, Or A Follow-Up?: Rash
Additional History: Patient states rash appeared on Tuesday last week. Patient does report receiving hard news last week causing her to cry and rub her eyes excessively. She states having nail polish on at that time.

## 2025-01-13 ENCOUNTER — APPOINTMENT (OUTPATIENT)
Dept: URBAN - METROPOLITAN AREA CLINIC 255 | Age: 35
Setting detail: DERMATOLOGY
End: 2025-01-14

## 2025-01-13 VITALS — WEIGHT: 180 LBS | HEIGHT: 64 IN

## 2025-01-13 DIAGNOSIS — L71.0 PERIORAL DERMATITIS: ICD-10-CM

## 2025-01-13 DIAGNOSIS — K13.0 DISEASES OF LIPS: ICD-10-CM

## 2025-01-13 PROCEDURE — OTHER PRESCRIPTION: OTHER

## 2025-01-13 PROCEDURE — OTHER PRESCRIPTION MEDICATION MANAGEMENT: OTHER

## 2025-01-13 PROCEDURE — OTHER COUNSELING: OTHER

## 2025-01-13 PROCEDURE — OTHER MIPS QUALITY: OTHER

## 2025-01-13 PROCEDURE — 99214 OFFICE O/P EST MOD 30 MIN: CPT

## 2025-01-13 RX ORDER — AMOXICILLIN 250 MG/1
CAPSULE ORAL
Qty: 60 | Refills: 0 | Status: ERX | COMMUNITY
Start: 2025-01-13

## 2025-01-13 ASSESSMENT — LOCATION SIMPLE DESCRIPTION DERM
LOCATION SIMPLE: RIGHT LIP
LOCATION SIMPLE: LEFT LIP
LOCATION SIMPLE: RIGHT UPPER LIP

## 2025-01-13 ASSESSMENT — LOCATION DETAILED DESCRIPTION DERM
LOCATION DETAILED: RIGHT ORAL COMMISSURE
LOCATION DETAILED: RIGHT SUPERIOR VERMILION BORDER
LOCATION DETAILED: LEFT LOWER CUTANEOUS LIP

## 2025-01-13 ASSESSMENT — LOCATION ZONE DERM: LOCATION ZONE: LIP

## 2025-01-13 NOTE — PROCEDURE: PRESCRIPTION MEDICATION MANAGEMENT
Plan: Cont. Topical metronidazole.
Render In Strict Bullet Format?: No
Initiate Treatment: Take one amoxicillin 250 mg capsule twice daily with food and water.
Detail Level: Zone
Continue Regimen: Apply nystatin 100,000 unit/gram topical ointment to corners of mouth twice a day until resolved.
Plan: If no improvement, consider f/u with PCP for labs.

## 2025-03-19 ENCOUNTER — APPOINTMENT (OUTPATIENT)
Age: 35
Setting detail: DERMATOLOGY
End: 2025-03-19

## 2025-03-19 VITALS — HEIGHT: 64 IN | WEIGHT: 180 LBS

## 2025-03-19 DIAGNOSIS — Q819 OTHER SPECIFIED ANOMALIES OF SKIN: ICD-10-CM

## 2025-03-19 DIAGNOSIS — K13.0 DISEASES OF LIPS: ICD-10-CM

## 2025-03-19 DIAGNOSIS — Q826 OTHER SPECIFIED ANOMALIES OF SKIN: ICD-10-CM

## 2025-03-19 DIAGNOSIS — Z71.89 OTHER SPECIFIED COUNSELING: ICD-10-CM

## 2025-03-19 DIAGNOSIS — Q828 OTHER SPECIFIED ANOMALIES OF SKIN: ICD-10-CM

## 2025-03-19 DIAGNOSIS — L57.8 OTHER SKIN CHANGES DUE TO CHRONIC EXPOSURE TO NONIONIZING RADIATION: ICD-10-CM

## 2025-03-19 PROBLEM — L85.8 OTHER SPECIFIED EPIDERMAL THICKENING: Status: ACTIVE | Noted: 2025-03-19

## 2025-03-19 PROCEDURE — OTHER PATIENT SPECIFIC COUNSELING: OTHER

## 2025-03-19 PROCEDURE — 98005 SYNCH AUDIO-VIDEO EST LOW 20: CPT

## 2025-03-19 PROCEDURE — OTHER COUNSELING: OTHER

## 2025-03-19 PROCEDURE — OTHER OTC TREATMENT REGIMEN: OTHER

## 2025-03-19 PROCEDURE — OTHER PRESCRIPTION MEDICATION MANAGEMENT: OTHER

## 2025-03-19 PROCEDURE — OTHER CONSENT FOR TELEMEDICINE VISIT OBTAINED: OTHER

## 2025-03-19 PROCEDURE — OTHER REASON FOR TELEMEDICINE VISIT: OTHER

## 2025-03-19 ASSESSMENT — LOCATION SIMPLE DESCRIPTION DERM
LOCATION SIMPLE: LEFT POSTERIOR UPPER ARM
LOCATION SIMPLE: RIGHT UPPER LIP
LOCATION SIMPLE: RIGHT POSTERIOR UPPER ARM
LOCATION SIMPLE: RIGHT FOREARM
LOCATION SIMPLE: LEFT FOREARM

## 2025-03-19 ASSESSMENT — LOCATION ZONE DERM
LOCATION ZONE: ARM
LOCATION ZONE: LIP

## 2025-03-19 ASSESSMENT — LOCATION DETAILED DESCRIPTION DERM
LOCATION DETAILED: RIGHT PROXIMAL DORSAL FOREARM
LOCATION DETAILED: LEFT PROXIMAL DORSAL FOREARM
LOCATION DETAILED: LEFT PROXIMAL POSTERIOR UPPER ARM
LOCATION DETAILED: RIGHT SUPERIOR VERMILION BORDER
LOCATION DETAILED: RIGHT PROXIMAL POSTERIOR UPPER ARM

## 2025-03-19 NOTE — HPI: ACNE (PATIENT REPORTED)
Where Is Your Acne Located?: face
Additional Comments (Use Complete Sentences): Patient reports wanting to discuss adding a topical retinol into her regimen to help with acne scarring and texture. She states not using retinol in the past.\\n\\nShe presents via telehealth for further evaluation, management and treatment.

## 2025-03-19 NOTE — PROCEDURE: OTC TREATMENT REGIMEN
Detail Level: Zone
Patient Specific Otc Recommendations (Will Not Stick From Patient To Patient): Patient can use Amlactin or CeraVe Rough & Bumpy daily for KP maintenance.
Patient Specific Otc Recommendations (Will Not Stick From Patient To Patient): AlphaRet, Cerave Retinol

## 2025-03-19 NOTE — PROCEDURE: PRESCRIPTION MEDICATION MANAGEMENT
Continue Regimen: Apply nystatin 100,000 unit/gram topical ointment to corners of mouth twice a day until resolved when flares occur.
Detail Level: Zone
Render In Strict Bullet Format?: No

## 2025-03-19 NOTE — PROCEDURE: PATIENT SPECIFIC COUNSELING
- Informed patient retinol can cause perioral flares. Advised patient if she gets perioral flare, to contact clinic and we will treat perioral and stop using retinol until clear.\\n- Reviewed prescription retinols vs. OTC retinols vs medical grade retinols in details. Pros and cons discussed.\\n- Recommended [employer] Overnight Alpha Ret to start with. Discussed can increase to prescription if wanting something stronger.\\n- Due to price point of Alpha Ret, patient chose to start with OTC CeraVe Skin Renewing retinol.\\n- Advised patient to start with every 3 nights, if tolerating can increase to every other night, and if still tolerating well can increase to nightly.\\n- Discussed it can take multiple months for products to work and to see results.\\n- Patient expressed understanding.
Detail Level: Zone

## 2025-05-20 ENCOUNTER — RX ONLY (RX ONLY)
Age: 35
End: 2025-05-20

## 2025-05-20 RX ORDER — TRETIONIN 0.25 MG/G
CREAM TOPICAL
Qty: 45 | Refills: 1 | Status: ERX | COMMUNITY
Start: 2025-05-20

## 2025-08-16 ENCOUNTER — HEALTH MAINTENANCE LETTER (OUTPATIENT)
Age: 35
End: 2025-08-16